# Patient Record
Sex: FEMALE | Race: BLACK OR AFRICAN AMERICAN | NOT HISPANIC OR LATINO | Employment: UNEMPLOYED | URBAN - METROPOLITAN AREA
[De-identification: names, ages, dates, MRNs, and addresses within clinical notes are randomized per-mention and may not be internally consistent; named-entity substitution may affect disease eponyms.]

---

## 2018-01-30 PROBLEM — S83.242A TEAR OF MEDIAL MENISCUS OF LEFT KNEE: Status: ACTIVE | Noted: 2018-01-30

## 2018-02-08 PROBLEM — Z74.09 IMPAIRED FUNCTIONAL MOBILITY, BALANCE, AND ENDURANCE: Status: ACTIVE | Noted: 2018-02-08

## 2018-02-08 PROBLEM — R29.3 ABNORMAL POSTURE: Status: ACTIVE | Noted: 2018-02-08

## 2018-02-08 PROBLEM — Z78.9 DIFFICULTY NAVIGATING STAIRS: Status: ACTIVE | Noted: 2018-02-08

## 2018-02-08 PROBLEM — R26.2 DIFFICULTY IN WALKING: Status: ACTIVE | Noted: 2018-02-08

## 2018-02-08 PROBLEM — M62.81 MUSCLE WEAKNESS OF LOWER EXTREMITY: Status: ACTIVE | Noted: 2018-02-08

## 2018-02-08 PROBLEM — R26.9 ABNORMALITY OF GAIT: Status: ACTIVE | Noted: 2018-02-08

## 2018-02-08 PROBLEM — M25.562 LEFT KNEE PAIN: Status: ACTIVE | Noted: 2018-02-08

## 2018-02-08 PROBLEM — M25.462 SWELLING OF KNEE JOINT, LEFT: Status: ACTIVE | Noted: 2018-02-08

## 2018-02-08 PROBLEM — M25.662 DECREASED RANGE OF MOTION OF LEFT KNEE: Status: ACTIVE | Noted: 2018-02-08

## 2019-05-30 ENCOUNTER — INITIAL CONSULT (OUTPATIENT)
Dept: BARIATRICS | Facility: CLINIC | Age: 49
End: 2019-05-30

## 2019-05-30 ENCOUNTER — CLINICAL SUPPORT (OUTPATIENT)
Dept: BARIATRICS | Facility: CLINIC | Age: 49
End: 2019-05-30

## 2019-05-30 VITALS
HEART RATE: 92 BPM | WEIGHT: 293 LBS | SYSTOLIC BLOOD PRESSURE: 150 MMHG | HEIGHT: 67 IN | BODY MASS INDEX: 45.99 KG/M2 | DIASTOLIC BLOOD PRESSURE: 90 MMHG

## 2019-05-30 VITALS — WEIGHT: 293 LBS | HEIGHT: 67 IN | BODY MASS INDEX: 45.99 KG/M2

## 2019-05-30 DIAGNOSIS — M25.561 RIGHT KNEE PAIN, UNSPECIFIED CHRONICITY: ICD-10-CM

## 2019-05-30 DIAGNOSIS — N39.3 URINARY, INCONTINENCE, STRESS FEMALE: ICD-10-CM

## 2019-05-30 DIAGNOSIS — M50.90 CERVICAL DISC DISORDER: ICD-10-CM

## 2019-05-30 DIAGNOSIS — E66.01 MORBID OBESITY WITH BMI OF 45.0-49.9, ADULT: ICD-10-CM

## 2019-05-30 PROBLEM — R26.2 DIFFICULTY IN WALKING: Status: RESOLVED | Noted: 2018-02-08 | Resolved: 2019-05-30

## 2019-05-30 PROBLEM — Z78.9 DIFFICULTY NAVIGATING STAIRS: Status: RESOLVED | Noted: 2018-02-08 | Resolved: 2019-05-30

## 2019-05-30 PROBLEM — R29.3 ABNORMAL POSTURE: Status: RESOLVED | Noted: 2018-02-08 | Resolved: 2019-05-30

## 2019-05-30 PROBLEM — Z74.09 IMPAIRED FUNCTIONAL MOBILITY, BALANCE, AND ENDURANCE: Status: RESOLVED | Noted: 2018-02-08 | Resolved: 2019-05-30

## 2019-05-30 PROBLEM — R26.9 ABNORMALITY OF GAIT: Status: RESOLVED | Noted: 2018-02-08 | Resolved: 2019-05-30

## 2019-05-30 PROBLEM — M62.81 MUSCLE WEAKNESS OF LOWER EXTREMITY: Status: RESOLVED | Noted: 2018-02-08 | Resolved: 2019-05-30

## 2019-05-30 PROCEDURE — 99205 PR OFFICE/OUTPT VISIT, NEW, LEVL V, 60-74 MIN: ICD-10-PCS | Mod: S$GLB,,, | Performed by: NURSE PRACTITIONER

## 2019-05-30 PROCEDURE — 99999 PR PBB SHADOW E&M-EST. PATIENT-LVL IV: CPT | Mod: PBBFAC,,, | Performed by: NURSE PRACTITIONER

## 2019-05-30 PROCEDURE — 99499 UNLISTED E&M SERVICE: CPT | Mod: S$GLB,,, | Performed by: DIETITIAN, REGISTERED

## 2019-05-30 PROCEDURE — 99999 PR PBB SHADOW E&M-EST. PATIENT-LVL III: CPT | Mod: PBBFAC,,, | Performed by: DIETITIAN, REGISTERED

## 2019-05-30 PROCEDURE — 99999 PR PBB SHADOW E&M-EST. PATIENT-LVL III: ICD-10-PCS | Mod: PBBFAC,,, | Performed by: DIETITIAN, REGISTERED

## 2019-05-30 PROCEDURE — 99205 OFFICE O/P NEW HI 60 MIN: CPT | Mod: S$GLB,,, | Performed by: NURSE PRACTITIONER

## 2019-05-30 PROCEDURE — 99499 NO LOS: ICD-10-PCS | Mod: S$GLB,,, | Performed by: DIETITIAN, REGISTERED

## 2019-05-30 PROCEDURE — 99999 PR PBB SHADOW E&M-EST. PATIENT-LVL IV: ICD-10-PCS | Mod: PBBFAC,,, | Performed by: NURSE PRACTITIONER

## 2019-05-30 RX ORDER — TIZANIDINE HYDROCHLORIDE 6 MG/1
6 CAPSULE, GELATIN COATED ORAL 3 TIMES DAILY PRN
COMMUNITY
Start: 2019-03-21 | End: 2020-02-09

## 2019-05-30 RX ORDER — LISDEXAMFETAMINE DIMESYLATE 60 MG/1
60 CAPSULE ORAL EVERY MORNING
Refills: 0 | COMMUNITY
Start: 2019-04-09 | End: 2021-07-20

## 2019-05-30 RX ORDER — OMEPRAZOLE 40 MG/1
40 CAPSULE, DELAYED RELEASE ORAL EVERY MORNING
COMMUNITY
End: 2021-07-20

## 2019-05-30 RX ORDER — METHYLPREDNISOLONE 4 MG/1
TABLET ORAL
Refills: 2 | COMMUNITY
Start: 2019-03-20 | End: 2019-05-30 | Stop reason: ALTCHOICE

## 2019-05-30 RX ORDER — CLOBETASOL PROPIONATE 0.46 MG/ML
SOLUTION TOPICAL
Refills: 5 | COMMUNITY
Start: 2019-04-09 | End: 2021-07-20

## 2019-05-30 RX ORDER — IBUPROFEN 800 MG/1
800 TABLET ORAL 3 TIMES DAILY PRN
COMMUNITY

## 2019-05-30 NOTE — PROGRESS NOTES
BARIATRIC NEW PATIENT EVALUATION    CHIEF COMPLAINT:   Morbid Obesity Body mass index is 46.83 kg/m². and difficulty with weight loss.     HISTORY OF PRESENT ILLNESS:  Jeremiah Fenton  is a 48 y.o. female presenting for morbid obesity Body mass index is 46.83 kg/m². and difficulty with weight loss. The patient has tried low CHO diet, modified Keto, gym. She was able to lose 40 pounds with diet and exercise, which she subsequently regained. Physical activity is limited 2/2 knee pain. Wt difficulty began with childbirth.  Today is her highest wt.     H/o achalasia, treated surgically Heller myotomy with Anuj fundoplication   H/o H pylori treatment.     Stress incontinence.  Denies h/o recurrent UTI.     Denies any psychiatric history.     PAST MEDICAL HISTORY:  Past Medical History:   Diagnosis Date    Achalasia     Anemia     Cervical disc disorder     GERD (gastroesophageal reflux disease)     Medial meniscus tear     LEFT    Sinus arrhythmia     Urinary, incontinence, stress female     Wears glasses     Wears glasses          PAST SURGICAL HISTORY:  Past Surgical History:   Procedure Laterality Date    APPENDECTOMY      ARTHROSCOPY-KNEE Left 2018    [facility]     SECTION, CLASSIC      X 3    ESOPHAGOMYOTOMY, LAPAROSCOPIC, HELLER N/A 2013    [facility] OR 2ND FLR    FUNDOPLICATION N/A 2013    [facility] at [facility] OR 2ND FLR    Heller myotomy with Anuj fundoplication      HYSTERECTOMY      meniscal tear repair, left knee      MENISCECTOMY-MEDIAL PARTIAL Left 2018    [facility] OR    MOTILITY STUDY, ESOPHAGUS, USING HIGH RESOLUTION MANOMETRY N/A 2013    Performed by Eric Clement [facility] at [facility] ENDO (4TH FLR)    REPAIR-HERNIA-HIATAL-LAPAROSCOPIC N/A 2013    Per[facility] by [facility] at [facility] OR 2ND FLR        FAMILY HISTORY:  Family History   Problem Relation Age of Onset    Hypertension Mother     Hypertension Father     Stroke Father     Hypertension Sister        SOCIAL HISTORY:  Social History     Socioeconomic History    Marital status:      Spouse name: Not on file    Number of children: Not on file    Years of education: Not on file    Highest education level: Not on file   Occupational History    Not on file   Social Needs    Financial resource strain: Not on file    Food insecurity:     Worry: Not on file     Inability: Not on file    Transportation needs:     Medical: Not on file     Non-medical: Not on file   Tobacco Use    Smoking status: Never Smoker    Smokeless tobacco: Never Used   Substance and Sexual Activity    Alcohol use: No    Drug use: No    Sexual activity: Not on file   Lifestyle    Physical activity:     Days per week: Not on file     Minutes per session: Not on file    Stress: Not on file   Relationships    Social connections:     Talks on phone: Not on file     Gets together: Not on file     Attends Yarsanism service: Not on file     Active member of club or organization: Not on file     Attends meetings of clubs or organizations: Not on file     Relationship status: Not on file   Other Topics Concern    Not on file   Social History Narrative    Pharmacist.        MEDICATIONS:  Current Outpatient Medications   Medication Sig Dispense Refill    clobetasol (TEMOVATE) 0.05 % external solution DEMETRIA TOPICALLY TO SCALP BID as needed  5    ibuprofen (ADVIL,MOTRIN) 800 MG tablet Take 800 mg by mouth 3 (three) times daily as needed for Pain.      omeprazole (PRILOSEC) 40 MG capsule Take 40 mg by mouth every morning. Only takes with ibuprofen      VYVANSE 60 mg capsule Take 60 mg by mouth every morning.  0    tiZANidine (ZANAFLEX) 6 mg capsule 6 mg 3 (three) times daily as needed.        No current facility-administered medications for this visit.   "      ALLERGIES:  Review of patient's allergies indicates:   Allergen Reactions    Dilaudid [hydromorphone (bulk)] Itching       ROS:  Review of Systems   Constitutional: Negative for chills, fever and malaise/fatigue.   Respiratory: Negative for cough, shortness of breath (denies ALAS) and wheezing.    Cardiovascular: Positive for leg swelling. Negative for chest pain and palpitations.   Gastrointestinal: Negative for abdominal pain, blood in stool, constipation, diarrhea, heartburn, melena, nausea and vomiting.   Genitourinary: Positive for urgency (urge incontinence, baseline). Negative for dysuria and frequency.   Musculoskeletal: Positive for joint pain and neck pain. Negative for back pain and myalgias.   Neurological: Positive for headaches (every now and then). Negative for dizziness and tingling.   Psychiatric/Behavioral: Negative for depression, substance abuse and suicidal ideas. The patient is not nervous/anxious.        PE:  Vitals:    05/30/19 1530   BP: (!) 150/90   Pulse: 92   Weight: 135.6 kg (299 lb)   Height: 5' 7" (1.702 m)       Physical Exam   Constitutional: She is oriented to person, place, and time. She appears well-developed and well-nourished. No distress.   Morbidly obese   HENT:   Head: Normocephalic and atraumatic.   Eyes: Conjunctivae are normal. Right eye exhibits no discharge. Left eye exhibits no discharge. No scleral icterus.   Neck: Neck supple.   Cardiovascular: Normal rate, regular rhythm and normal heart sounds. Exam reveals no gallop and no friction rub.   No murmur heard.  Pulmonary/Chest: Effort normal and breath sounds normal. No respiratory distress. She has no wheezes. She has no rales.   Abdominal: Soft. Bowel sounds are normal. She exhibits no distension and no mass. There is no tenderness. There is no rebound and no guarding. No hernia.   Musculoskeletal: Normal range of motion. She exhibits no edema.   Neurological: She is alert and oriented to person, place, and " time.   Skin: Skin is warm, dry and intact. No rash noted. She is not diaphoretic. No erythema. No pallor.   Psychiatric: She has a normal mood and affect. Her speech is normal and behavior is normal. Judgment and thought content normal.   Nursing note and vitals reviewed.       DIAGNOSIS:  1. Morbid Obesity with Body mass index is 46.83 kg/m². and difficulty with weight loss.  2. Co-morbidities: none  3. H/o achalasia, h/o Heller myotomy with Anuj fundoplication     PLAN:  The patient is a potential candidate for Bariatric Surgery. The surgery and post-op care were discussed in detail with the patient. All questions were answered. She will work with Dr. Appiah. Will discuss her case re: Heller myotomy with Anuj fundoplication and surgical options.     she understands that bariatric surgery is a tool to aid in weight loss and that she needs to be committed to the diet and exercise post-operatively for successful weight loss.  Discussed expected weight loss outcomes after surgery which is 50% of the excess weight on her frame. Discussed with patient that bariatric surgery is not the easy way out and that it will take plenty of dedication on the patient's part to be successful. Also discussed the possibility of weight regain if the patient strays from the diet guidelines or exercise requirements. Patient verbalized understanding and wishes to proceed with the work-up.    ORDERS:  1. Chest X-Ray and EKG  2. Psychological Consult and Bariatric Dietician Consult  3. Bariatric Labs: BMP, CBC, Folate Serum, H. pylori, HgA1C, Hepatic Panel/LFT, Iron & TIBC, Lipid Profile, Magnesium, Phosphate, T3, T4, TSH, Free T4, Vitamin B12, and Vitamin B1.  4. Short form psych testing    Primary Physician: Norbert Robison MD  RTC: As scheduled.    60 minute visit, over 50% of time spent counseling patient face to face on surgical options, risks, benefits, expected diet, recommended exercise regimen, and expected weight loss.

## 2019-05-30 NOTE — Clinical Note
Dr. Appiah,This is a patient that Olivia saw on Thursday. This patient is interested in having a sleeve, but she has a hx of a Heller myotomy with josue fundoplication due to achalasia completed by you some years ago. Before she moves forward she wants to know if she can 1. Have the surgery 2. If any procedure is better with her medical hx 3. If she will suffer with reflux post procedure. I have included Olivia's chart in this message as she was the one who actually consulted the patient and I am covering for her until she returns from vacation.

## 2019-05-30 NOTE — PATIENT INSTRUCTIONS
Continue to review Bariatric Nutrition Guidebook at home and call with any questions.  Work on expanding variety of vegetables.  Work on gradually cutting back on starchy CHO in the diet.  Begin trying various protein supplements to determine preference.  5-6 meals per day.  More grocery shopping and meal preparation at home.  Return to clinic.  keep food log.    Meal Ideas for Regular Bariatric Diet  *Recipes and products available at www.bariatriceating.com      Breakfast: (15-20g protein)    - Egg white omelet: 2 egg whites or ½ cup Egg Beaters. (Optional proteins: cheese, shrimp, black beans, chicken, sliced turkey) (Optional veggies: tomatoes, salsa, spinach, mushrooms, onions, green peppers, or small slice avocado)     - Egg and sausage: 1 egg or ¼ cup Egg Beaters (any variety), with 1 rosa or 2 links of Turkey sausage or Veggie breakfast sausage (Docracy or [employer])    - Crust-less breakfast quiche: To make a glass pie dish, mix 4oz part skim Ricotta, 1 cup skim milk, and 2 eggs as your base. Add protein: shredded cheese, sliced lean ham or turkey, turkey lester/sausage. Add veggies: tomato, onion, green onion, mushroom, green pepper, spinach, etc.    - Yogurt parfait: Mix 1 - 6oz container Dannon Light N Fit vanilla yogurt, with ¼ cup crushed unsalted nuts    - Cottage cheese and fruit: ½ cup part-skim cottage cheese or ricotta cheese topped with fresh fruit or sugar free preserves     - Selena Marroquin's Vanilla Egg custard* (add 2 Tbsp instant coffee granules to make Cappuccino Custard*)    - Hi-Protein café latte (skim milk, decaf coffee, 1 scoop protein powder). Optional to add Sugar free syrup or extract flavoring.    - Breakfast Lox: spread fat free cream cheese on slices of smoked salmon. Serve over scrambled or egg over easy (sauteed with nonstick cookspray) OR on a cucumber slice    - Eggwhich: Scramble or cook 1 large egg over easy using nonstick cookspray. Place between 2 slices of Malagasy  lester and low fat cheese.     Lunch: (20-30g protein)    - ½ cup Black bean soup (Homemade or Progresso), with ¼ cup shredded low-fat cheese. Top with chopped tomato or fresh salsa.     - Lean deli turkey breast and low-fat sliced cheese, mustard or light meek to moisten, rolled up together, or wrapped in a Kleber lettuce leaf    - Chicken salad made from dinner leftovers, moisten with low-fat salad dressing or light meek. Also try leftover salmon, shrimp, tuna or boiled eggs. Serve ½ cup over dark green salad    - Fat-free canned refried beans, topped with ¼ cup shredded low-fat cheese. Top with chopped tomato or fresh salsa.     - Greek salad: Top mixed greens with 1-2oz grilled chicken, tomatoes, red onions, 2-3 kalamata olives, and sprinkle lightly with feta cheese. Spritz with Balsamic vinegar to taste.     - Crust-less lunch quiche: To make a glass pie dish, mix 4oz part skim Ricotta, 1 cup skim milk, and 2 eggs as your base. Add protein: shredded cheese, sliced lean ham or turkey, shrimp, chicken. Add veggies: tomato, onion, green onion, mushroom, green pepper, spinach, artichoke, broccoli, etc.    - Pizza bake: spread a  aranza angelina mushroom with tomato sauce, low-fat shredded mozzarella and turkey pepperoni or Moroccan lester. Add any veggies. Roast for 10-15 minutes, until cheese melted.     - Cucumber crab bites: Spread ¼ cup crab dip (lump crabmeat + light cream cheese and green onions) over sliced cucumber.     - Chicken with light spinach and artichoke dip*: Puree in : 6oz cooked and drained spinach, 2 cloves garlic, 1 can cannelloni beans, ½ cup chopped green onions, 1 can drained artichoke hearts (not marinated in oil), lemon juice and basil. Mix in 2oz chopped up chicken.    Supper: (20-30g protein)    - Serve grilled fish over dark green salad tossed with low-fat dressing, served with grilled asparagus wade     - Rotisserie chicken salad: served with sliced strawberries, walnuts,  fat-free feta cheese crumbles and 1 tbsp Freitass Own Light Raspberry Cushing Vinaigrette    - Shrimp cocktail: Dip cold boiled shrimp in homemade low-sugar cocktail sauce (1/2 cup Cathie One Carb ketchup, 2 tbsp horseradish, 1/4 tsp hot sauce, 1 tsp Worcestershire sauce, 1 tbsp freshly-squeezed lemon juice). Serve with dark green salad, walnuts, and crumbled blue cheese drizzled with olive oil and Balsamic vinegar    - Tuna Melt: Spread tuna salad onto 2 thick slices of tomato. Top with low-fat cheese and broil until cheese is melted. May also be made with chicken salad of shrimp salad. [place] with different types of cheeses.    - Chicken or beef fajitas (no tortilla, rice, beans, chips). Top meat and veggies w/ fresh salsa, fat free sour cream.     - Homemade low-fat Chili using extra lean ground beef or ground turkey. Top with shredded cheese and salsa as desired. May add dollop fat-free sour cream if desired    - Chicken parmesan: Top chicken breast w/ low sugar marinara sauce, mozzarella cheese and bake until chicken reaches 165*.  Serve w/ spaghetti SQUASH or Georgian cut green beans    - Dinner Omelet with shrimp or chicken and onion, green peppers and chives.    - No noodle lasagna: Use sliced zucchini or eggplant in place of noodles.  Layer with part skim ricotta cheese and low sugar meat sauce (use very lean ground beef or ground turkey).    - Mexican chicken bake: Bake chunks of chicken breast or thigh with taco seasoning, Pace brand enchilada sauce, green onions and low-fat cheese. Serve with ¼ cup black beans or fat free refried beans topped with chopped tomatoes or salsa.    - Karl frozen meatballs, simmered in Classico Marinara sauce. Different flavors of salsa or spaghetti sauce create different dishes! Sprinkle with parmesan cheese. Serve with grilled or steamed veggies, or a dark green salad.    - Simmer boneless skinless chicken thigh chunks in Classico Marinara sauce or roasted salsa until  tender with chopped onion, bell pepper, garlic, mushrooms, spinach, etc.     - Hamburger or veggie burger, without the bun, dressed the way you like. Served with grilled or steamed veggies.    - Eggplant parmesan: Bake slices of eggplant at 350 degrees for 15 minutes. Layer tomato sauce, sliced eggplant and low-fat mozzarella cheese in a baking dish and cover with foil. Bake 30-40 more minutes or until bubbly. Uncover and bake at 400 degrees for about 15 more minutes, or until top is slightly crisp.    - Fish tacos: grilled/baked white fish, wrapped in Kleber lettuce leaf, topped with salsa, shredded low-fat cheese, and light coleslaw.    - Chicken artis: Sprinkle chicken w/ 1 tsp of hidden valley ranch dip mix. Then grill chicken and top with black beans, salsa and 1 tsp fat free sour cream.     - Cauliflower pizza crust: Use cauliflower as crust (see recipe on [facility], no flour!). Top w/ low fat cheese, turkey pepperoni and veggies and bake again    - chicken or turkey crust pizza: use ground chicken or turkey instead of cauliflower, spread in [place] and bake at 350 for about 20-30 minutes(may want to add garlic, black pepper, oregano and other herbs to ground meat mixture).  Remove and top w/ low fat cheese, turkey pepperoni and veggies and bake again for another 10 minutes or until cheese is browned.     Snacks: (100-200 calories; >5g protein)    - 1 low-fat cheese stick with 8 cherry tomatoes or 1 serving fresh fruit  - 4 thin slices fat-free turkey breast and 1 slice low-fat cheese  - 4 thin slices fat-free honey ham with wedge of melon  - 6-8 edamame pods (equivalent to about 1/4 cup edamame without pods).   - 1/4 cup unsalted nuts with ½ cup fruit  - 6-oz container Dannon Light n Fit vanilla yogurt, topped with 1oz unsalted nuts         - apple, celery or baby carrots spread with 2 Tbsp PB2  - apple slices with 1 oz slice low-fat cheese  - Apple slices dipped in 2 Tbsp of PB2  - celery, cucumber, bell  pepper or baby carrots dipped in ¼ cup hummus bean spread or light spinach and artichoke dip (*recipe in lunch section)  - celery, cucumber, baby carrots dipped in high protein greek yogurt (Mix 16 oz plain greek yogurt + 1 packet of hidden valley ranch dip mix)  - Jesus Links Beef Steak - 14g protein! (similar to beef jerky)  - 2 wedges Laughing Cow - Light Herb & Garlic Cheese with sliced cucumber or green bell pepper  - 1/2 cup low-fat cottage cheese with ¼ cup fruit or ¼ cup salsa  - RTD Protein drinks: Atkins, Low Carb Slim Fast, EAS light, Muscle Milk Light, etc.  - Homemade Protein drinks: GNC Soy95, Isopure, Nectar, UNJURY, Whey Gourmet, etc. Mix 1 scoop powder with 8oz skim/1% milk or light soymilk.  - Protein bars: Atkins, EAS, Pure Protein, Think Thin, Detour, etc. Must have 0-4 grams sugar - Read the label.    Takeout Options: No more than twice/week  Deli - Salads (no pasta or rice), meats, cheeses. Roasted chicken. Lox (salmon)    Mexican - Platters which don't include tortillas, chips, or rice. Go easy on the beans. Example: Fajitas without the tortillas. Ask the  not to bring chips to the table if they are too tempting.    Greek - Meat or fish and vegetable, but no bread or rice. Including hummus, baba ganoush, etc, is OK. Most sit-down Greek restaurants can provide you with cucumber slices for dipping instead of rosalinda bread.    Fast Food (Avoid as much as possible) - Salads (no croutons and limit salad dressing to 2 tbsp), grilled chicken sandwich without the bun and ask for no meek. Mikalas low fat chili or Taco Bell pintos and cheese.    BBQ - The meats are fine if you ask for sauces on the side, but most of the traditional side dishes are loaded with carbs. Jack slaw, baked beans and BBQ sauce are typically made with sugar.    Chinese - Nothing deep-fried, no rice or noodles. Many Chinese sauces have starch and sugar in them, so you'll have to use your judgement. If you find that these  sauces trigger cravings, or cause Dumping, you can ask for the sauce to be made without sugar or just use soy sauce.      Lactose-free & Artificial Sweetener-free Protein Powders    Remember, your protein shake should have less than 4 grams of sugar per serving. For whey powders, choose 100% whey pro isolate, not a blend. Blends add creatine in as a filler.    Natural Zero-calorie sweeteners  ? Stevia  ? Truvia  ? Swerve (http://www.College[employer]Connect/about-swerve/)     Lactose-free Protein Powders  ? [facility] 100% Egg Protein (Vanilla & Chocolate)  ? [facility] Pro Performance 100% Soy Isolate (Vanilla & Chocolate)  ? Bluebonnet Protein Powder    Lactose-free Protein Shakes  ? Muscle Milk    Artificial Sweetener-free Protein Powders  ? Pure Protein Natural Whey Protein Powder (Vanilla, Chocolate & Strawberry)  ? Taras Peterson Whey Pro Isolate (sweetened w/ stevia) (Vanilla, Chocolate, Strawberry, Pina Coloda & Tropical Dreamsicle)  ? Nature's Best Natural Isopure - Unflavored (zero carb, found at [facility])    Lactose & Artificial-Sweetener-Free Protein Powders  ? Taras Peterson Egg White Protein (sweetened w/ stevia)  ? TwinLab Clean Series Soy Protein (unflavored, found at [facility])  ? About Time Whey Protein Isolate powder (from [employer], sweetened with stevia, gluten-free) (http://Tutellus.RailComm/shop/2lb-whey-protein-isolate/)

## 2019-05-30 NOTE — PATIENT INSTRUCTIONS
Dr. Vineet Shelton      Prior to surgery you will need to complete:  - Dietitian consult and follow up appointments as needed  - Labs  - Chest X-ray  - EKG  - Psychological evaluation, Please call psychiatry [phone] to schedule    In preparation for bariatric surgery, please complete the following:   · Discuss your current medications with your primary care provider, remember your medications will need to be crushed, chewable, or in liquid form for the first 3-6 months after a gastric bypass or sleeve.  For a gastric band, your medications will need to be crushed indefinitely.    · If you take a blood thinner such as: Coumadin (warfarin), Pradaxa (dabigatran), or Plavix (clopidogrel), you will need to speak with your prescribing provider on how or if this medication can be stopped before surgery.   · If you take a medication for depression or anxiety, you will need to begin crushing or opening the capsule 1-3 months prior to surgery.  Remember to discuss this with the psychologist or psychiatrist that you see.   · If you take medication for arthritis on a daily basis that is considered a non-steroidal anti-inflammatory (NSAID), please discuss with your prescribing physician an alternative medication.  After having gastric bypass or gastric sleeve, this group of medications is not appropriate to take due to increased risk of bleeding stomach ulcers.      DEFINITIONS  Appointments: Pre-scheduled meetings or consultations with any physician, advanced practice provider, dietitian, or psychologist, and labs, imaging studies, sleep studies, etc.   Late cancellation: Cancelling an appointment 24-48 hours prior to scheduled time.  No-Show appointment:  is when    You do NOT arrive to your appointment at the time its scheduled.   You call to cancel or cancel via [facility] less than 24 hours in advance of your scheduled appointment   You show up 15 minutes AFTER  your scheduled appointment time without any notification of being late.     POLICY  1. You are allowed up to 3 cancellations for appointments.    After 3 cancellations your case will be placed on hold for 2 months and after that time you can resume the program.   2. You are allowed only 1 no-show for an appointment.    You will be re-scheduled one time and if there is a 2nd no-show at any point, your case will be placed on hold for 3 months.  After 3 months you can resume the program.     3. Upon resuming the program after being placed on hold for either above mentioned reasons, if you have a late cancel or no show for any appointment, the bariatric team will review if youre an appropriate candidate for surgery at the monthly meeting.

## 2019-05-30 NOTE — LETTER
[facility] - Bariatric Surgery  [address]  [facility] [address]  Phone: [phone]  Fax: [phone] May 30, 2019      Norbert Robison MD  [address] [facility] [address]    Patient: Jeremiah Fenton   MR Number: [id number]   YOB: 1970   Date of Visit: 5/30/2019     Dear Dr. Robison:    Thank you for referring Jeremiah Fenton to me for evaluation. Below are the relevant portions of my assessment and plan of care.    DIAGNOSIS:  1. Morbid Obesity with Body mass index is 46.83 kg/m². and difficulty with weight loss.  2. Co-morbidities: none  3. H/o achalasia, h/o Heller myotomy with Anuj fundoplication      PLAN:  The patient is a potential candidate for bariatric surgery. The surgery and post-op care were discussed in detail with the patient. All questions were answered. She will work with Dr. Appiah. Will discuss her case re: Heller myotomy with Anuj fundoplication and surgical options.      She understands that bariatric surgery is a tool to aid in weight loss and that she needs to be committed to the diet and exercise post-operatively for successful weight loss.  We discussed expected weight loss outcomes after surgery which is 50% of the excess weight on her frame.  It was discussed with the patient that bariatric surgery is not the easy way out and that it will take plenty of dedication on the patient's part to be successful. We also discussed the possibility of weight regain if the patient strays from the diet guidelines or exercise requirements.  The patient verbalized understanding and wishes to proceed with the work-up.     ORDERS:  1. Chest X-Ray and EKG  2. Psychological Consult and Bariatric Dietician Consult  3. Bariatric Labs: BMP, CBC, Folate Serum, H. pylori, HgA1C, Hepatic Panel/LFT, Iron & TIBC, Lipid Profile, Magnesium, Phosphate, T3, T4, TSH, Free T4, Vitamin B12, and Vitamin B1.  4. Short form psych testing     60 minute visit, over 50% of time  spent counseling patient face to face on surgical options, risks, benefits, expected diet, recommended exercise regimen, and expected weight loss.    If you have questions, please do not hesitate to call me. I look forward to following Jeremiah along with you.    Sincerely,        RAZIA Clemons   Section of Bariatric Surgery  Department of Surgery  [facility]    NP/afw

## 2019-06-14 ENCOUNTER — PATIENT MESSAGE (OUTPATIENT)
Dept: BARIATRICS | Facility: CLINIC | Age: 49
End: 2019-06-14

## 2019-06-19 ENCOUNTER — PATIENT MESSAGE (OUTPATIENT)
Dept: BARIATRICS | Facility: CLINIC | Age: 49
End: 2019-06-19

## 2019-06-19 NOTE — TELEPHONE ENCOUNTER
Telephone call.   Conferred with Dr. Appiah re: sleeve after Heller. Conclusion: difficulty to know if a sleeve will give her GERD. If her main concern is GERD, Dr. Appiah suggests bypass. She denies any issues with GERD recently. Discussed bypass procedure. She will research additionally and notify us with final determination. She would like to rewatch seminar.

## 2020-02-04 ENCOUNTER — DOCUMENTATION ONLY (OUTPATIENT)
Dept: BARIATRICS | Facility: CLINIC | Age: 50
End: 2020-02-04

## 2020-02-09 PROBLEM — R50.9 FEVER: Status: ACTIVE | Noted: 2020-02-09

## 2020-02-09 PROBLEM — R07.9 CHEST PAIN: Status: ACTIVE | Noted: 2020-02-09

## 2020-02-09 PROBLEM — R10.9 LEFT FLANK PAIN: Status: ACTIVE | Noted: 2020-02-09

## 2021-05-04 ENCOUNTER — PATIENT MESSAGE (OUTPATIENT)
Dept: RESEARCH | Facility: HOSPITAL | Age: 51
End: 2021-05-04

## 2021-07-21 PROBLEM — Z12.11 SCREEN FOR COLON CANCER: Status: ACTIVE | Noted: 2021-07-21

## 2023-02-24 ENCOUNTER — TELEPHONE (OUTPATIENT)
Dept: BARIATRICS | Facility: CLINIC | Age: 53
End: 2023-02-24
Payer: COMMERCIAL

## 2023-02-24 NOTE — TELEPHONE ENCOUNTER
Phoned patient back and discussed her concerns and wanting to have bariatric surgery with Dr Appiah.  She had surgery back in 2013 with Dr Appiah for achalasia and had an esophagomyotomy and heller.  He suggested bariatric surgery [facility] after she had healed.  She knows she does not have bariatric coverage.  Explained the process of the FC phone call and she can get self pay prices then.  Scheduled FC appt an appt with Dr Appiah for a consult.  Advised that she can do the RD appts virtually and the psych appts as well.  Advised that Dr Appiah may want extensive testing that might only be done at [facility]. Told her that some of the testing might be able to be done closer to her home in [place].  She was very appreciative and verbalized understanding.

## 2023-02-24 NOTE — TELEPHONE ENCOUNTER
----- Message from Zeenat Segovia sent at 2/24/2023  4:11 PM CST -----  Regarding: appt needed  Contact: Pt @ [phone]  Pt is trying to be scheduled for appt with provider; pt states Dr. Appiah told her to come back for for follow up in a few months when she was completely healed. Please call to advise, thank you.

## 2023-03-14 ENCOUNTER — OFFICE VISIT (OUTPATIENT)
Dept: BARIATRICS | Facility: CLINIC | Age: 53
End: 2023-03-14
Payer: COMMERCIAL

## 2023-03-14 ENCOUNTER — PATIENT MESSAGE (OUTPATIENT)
Dept: BARIATRICS | Facility: CLINIC | Age: 53
End: 2023-03-14

## 2023-03-14 VITALS
DIASTOLIC BLOOD PRESSURE: 92 MMHG | SYSTOLIC BLOOD PRESSURE: 195 MMHG | BODY MASS INDEX: 44.88 KG/M2 | HEART RATE: 85 BPM | OXYGEN SATURATION: 95 % | WEIGHT: 293 LBS

## 2023-03-14 DIAGNOSIS — E66.01 MORBID OBESITY WITH BMI OF 45.0-49.9, ADULT: Primary | ICD-10-CM

## 2023-03-14 PROCEDURE — 99204 PR OFFICE/OUTPT VISIT, NEW, LEVL IV, 45-59 MIN: ICD-10-PCS | Mod: S$GLB,,, | Performed by: SURGERY

## 2023-03-14 PROCEDURE — 1159F PR MEDICATION LIST DOCUMENTED IN MEDICAL RECORD: ICD-10-PCS | Mod: CPTII,S$GLB,, | Performed by: SURGERY

## 2023-03-14 PROCEDURE — 3008F PR BODY MASS INDEX (BMI) DOCUMENTED: ICD-10-PCS | Mod: CPTII,S$GLB,, | Performed by: SURGERY

## 2023-03-14 PROCEDURE — 99204 OFFICE O/P NEW MOD 45 MIN: CPT | Mod: S$GLB,,, | Performed by: SURGERY

## 2023-03-14 PROCEDURE — 99999 PR PBB SHADOW E&M-EST. PATIENT-LVL III: ICD-10-PCS | Mod: PBBFAC,,, | Performed by: SURGERY

## 2023-03-14 PROCEDURE — 3080F PR MOST RECENT DIASTOLIC BLOOD PRESSURE >= 90 MM HG: ICD-10-PCS | Mod: CPTII,S$GLB,, | Performed by: SURGERY

## 2023-03-14 PROCEDURE — 1159F MED LIST DOCD IN RCRD: CPT | Mod: CPTII,S$GLB,, | Performed by: SURGERY

## 2023-03-14 PROCEDURE — 3080F DIAST BP >= 90 MM HG: CPT | Mod: CPTII,S$GLB,, | Performed by: SURGERY

## 2023-03-14 PROCEDURE — 1160F RVW MEDS BY RX/DR IN RCRD: CPT | Mod: CPTII,S$GLB,, | Performed by: SURGERY

## 2023-03-14 PROCEDURE — 99999 PR PBB SHADOW E&M-EST. PATIENT-LVL III: CPT | Mod: PBBFAC,,, | Performed by: SURGERY

## 2023-03-14 PROCEDURE — 1160F PR REVIEW ALL MEDS BY PRESCRIBER/CLIN PHARMACIST DOCUMENTED: ICD-10-PCS | Mod: CPTII,S$GLB,, | Performed by: SURGERY

## 2023-03-14 PROCEDURE — 3008F BODY MASS INDEX DOCD: CPT | Mod: CPTII,S$GLB,, | Performed by: SURGERY

## 2023-03-14 PROCEDURE — 3077F PR MOST RECENT SYSTOLIC BLOOD PRESSURE >= 140 MM HG: ICD-10-PCS | Mod: CPTII,S$GLB,, | Performed by: SURGERY

## 2023-03-14 PROCEDURE — 3077F SYST BP >= 140 MM HG: CPT | Mod: CPTII,S$GLB,, | Performed by: SURGERY

## 2023-03-14 NOTE — LETTER
March 14, 2023        Norbert Robison MD  [address] [facility] [place] [address]             [facility] - Bariatric Surg 2nd Fl  [address]  [facility] [address]  Phone: [phone]  Fax: [phone]   Patient: Jeremiah Fenton   MR Number: [id number]   YOB: 1970   Date of Visit: 3/14/2023       Dear Dr. Robison:    Thank you for referring Jeremiah Fenton to me for evaluation. Attached you will find relevant portions of my assessment and plan of care.    If you have questions, please do not hesitate to call me. I look forward to following Jeremiah Fenton along with you.    Sincerely,      Vineet Appiah MD            CC  No Recipients    Enclosure

## 2023-03-14 NOTE — PROGRESS NOTES
I have seen the patient, reviewed the Student's history and physical, assessment and plan. I have personally interviewed and examined the patient at bedside and: agree with the findings.     51y/o with left knee arthritis and bmi 44/312lb c/w morbid obesity.  S/p conchis/Anuj by me 2013.  She is doing well from achalasia standpoint.  She started gaining weight with her pregnancies.  She has tried diets without significant weight loss.  She has tried medications including vyvance without weight loss.  She is not currently exercising.  She has occasional heartburn and regurgitation (once a month) but takes no routine medications.  Sleeping with snoring.    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    PE abdomen soft, no hernias    Cbc, cmp reviewed, basically ok  Ct 2020 reviewed, films viewed    1. No colonic diverticula or evidence of diverticulitis.    2. Moderate amount of stool in the ascending and transverse colon, may reflect constipation.  No bowel dilatation.    3. 2 cm left ovarian follicular cyst.    4. Rectus diastasis  Cxr 2020 reviewed, improving pneumonia  Left knee xray reviewed, c/w arthritis  Ekg 2020 reviewed    Sinus tachycardia     Biatrial enlargement     Morbid obesity with likely rahul.    Obtain sleep study, egd and bariatric work up.  She is a good candidate for sleeve or bypass and she is considering it.

## 2023-03-14 NOTE — FIRST PROVIDER EVALUATION
.GENERAL SURGERY CLINIC NOTE    Jeremiah Fenton is a 52 y.o. female with left knee arthritis and morbid obesity (current BMI 44.8) with previous diagnosis of achalasia who underwent surgery on 2014 (Heller myotomy with josue fundoplication) who presents to clinic for bariatric surgery evaluation.  She is currently in her highest adult weight (312 lb at 51 yo). Lowest weight in adult life was 180 lbs at 21yo. She states that during childhood she had a normal BMI, however she gained weight during pregnancies (3) and could not loose them after (gain approximately 30-40 pounds during each pregnancy)  She states that her primary goal is to be able to loose weight.  She has tried multiple diets (keto), however has never followed a nutriotionist diet (only 25 years ago during pregnancy). She has tried inconsistently exercising: (walking at mornings). Currently not doing exercise.  Denies eating disorders (bulimia, anorexia) or use of laxatives for trying to loose weight. Believes that what she has tried before is not sustainable.  Denies smoking,drinking, denies other drugs  Lives with . Reports good support system.  Currently works as a pharmacist.  Takes vyvanse- once everyday, since 4 years ago without weight loss.      .GERD Questionnaire   - PPI  Ocasional Typical heartburn  ocasionally Regurgitation  - Dysphagia solids  - Dysphagia liquids  - Hoarseness  - Sore throat  - Cough  - Asthma  - Chest pain  - Water brash  - Globus  - Nausea  - Vomiting     ROS: Denies fever,shortness of breathness, symptoms of cold, diarrhea. States occasional headaches.    Past Medical History:   Diagnosis Date    Achalasia     Achalasia     Anemia     Cervical disc disorder     Esophageal stricture     lower, requiring dilatation    Family history of colonic polyps     mother    GERD (gastroesophageal reflux disease)     History of COVID-19     History of Helicobacter pylori infection     Medial meniscus tear     LEFT    Sinus  arrhythmia     Urinary, incontinence, stress female     Wears glasses     Wears glasses        Past Surgical History:   Procedure Laterality Date    APPENDECTOMY       SECTION, CLASSIC      X 3    COLONOSCOPY N/A 2021    Procedure: COLONOSCOPY;  Surgeon: Manjeet Macias MD;  Location: [facility];  Service: Endoscopy;  Laterality: N/A;  HAD COVID-VACCINATED    Heller myotomy with Anuj fundoplication      HYSTERECTOMY      meniscal tear repair, left knee         Social History     Socioeconomic History    Marital status:    Tobacco Use    Smoking status: Never    Smokeless tobacco: Never   Substance and Sexual Activity    Alcohol use: No    Drug use: No   Social History Narrative    Pharmacist.        Review of patient's allergies indicates:   Allergen Reactions    Dilaudid [hydromorphone (bulk)] Itching         PHYSICAL EXAM:  Vitals:    23 1605   BP: (!) 195/92   Pulse: 85         PERTINENT IMAGING:  Ct  reviewed, films viewed    1. No colonic diverticula or evidence of diverticulitis.    2. Moderate amount of stool in the ascending and transverse colon, may reflect constipation.  No bowel dilatation.    3. 2 cm left ovarian follicular cyst.    4. Rectus diastasis  Cxr  reviewed, improving pneumonia  Left knee xray reviewed, c/w arthritis  Ekg  reviewed    Sinus tachycardia     Biatrial enlargement       ASSESSMENT/PLAN:  Per dr Appiah's note: Obtain sleep study, egd and bariatric work up.  She is a good candidate for sleeve or bypass and she is considering it.

## 2023-03-24 ENCOUNTER — CLINICAL SUPPORT (OUTPATIENT)
Dept: BARIATRICS | Facility: CLINIC | Age: 53
End: 2023-03-24
Payer: COMMERCIAL

## 2023-03-24 ENCOUNTER — PATIENT MESSAGE (OUTPATIENT)
Dept: BARIATRICS | Facility: CLINIC | Age: 53
End: 2023-03-24

## 2023-03-24 DIAGNOSIS — Z71.3 DIETARY COUNSELING AND SURVEILLANCE: Primary | ICD-10-CM

## 2023-03-24 DIAGNOSIS — E66.01 MORBID OBESITY WITH BODY MASS INDEX (BMI) OF 40.0 OR HIGHER: ICD-10-CM

## 2023-03-24 PROCEDURE — 99499 UNLISTED E&M SERVICE: CPT | Mod: 95,,, | Performed by: DIETITIAN, REGISTERED

## 2023-03-24 PROCEDURE — 97802 MEDICAL NUTRITION INDIV IN: CPT | Mod: 95,,, | Performed by: DIETITIAN, REGISTERED

## 2023-03-24 PROCEDURE — 97802 PR MED NUTR THER, 1ST, INDIV, EA 15 MIN: ICD-10-PCS | Mod: 95,,, | Performed by: DIETITIAN, REGISTERED

## 2023-03-24 PROCEDURE — 99499 NO LOS: ICD-10-PCS | Mod: 95,,, | Performed by: DIETITIAN, REGISTERED

## 2023-03-24 NOTE — PROGRESS NOTES
"The patient location is:  Patient Home   The chief complaint leading to consultation is: morbid obesity in work up for bariatric surgery  Visit type: Virtual visit with synchronous audio and video  Total time spent with patient: 45 minutes  Each patient to whom he or she provides medical services by telemedicine is:  (1) informed of the relationship between the physician and patient and the respective role of any other health care provider with respect to management of the patient; and (2) notified that he or she may decline to receive medical services by telemedicine and may withdraw from such care at any time.  Nutrition Handout located in AVS section of pt's [facility] and/or sent as a message via [facility] portal.  Pt informed of handout and how to access this information in [employer] shelbie.  NUTRITIONAL CONSULT    Referring Physician: Vineet Appiah M.D.   Reason for MNT Referral: Initial assessment for laparoscopic Karena-en-Y work-up    PAST MEDICAL HISTORY:   52 y.o. female  There is no height or weight on file to calculate BMI..  Weight history includes She started gaining weight with her pregnancies.    Dieting attempts include She has tried diets ie Keto without significant weight loss.  She has tried medications including vyvance without weight loss...    Past Medical History:   Diagnosis Date    Achalasia     Achalasia     Anemia     Cervical disc disorder     Esophageal stricture     lower, requiring dilatation    Family history of colonic polyps     mother    GERD (gastroesophageal reflux disease)     History of COVID-19     History of Helicobacter pylori infection     Medial meniscus tear     LEFT    Sinus arrhythmia     Urinary, incontinence, stress female     Wears glasses     Wears glasses        CLINICAL DATA:  52 y.o.-year-old Black or  female.  Height: 5'10"  Weight: 312 lbs  IBW: 156 lbs  BMI: 44.88  The patient's goal weight (50% EBW): 234 lbs      Goal for Bariatric " Surgery: to improve health and to improve quality of life    DAILY NUTRITIONAL NEEDS: pre-op nutritional bariatric guidelines to promote weight loss  5166-0090 Calories    Grams Protein    NUTRITION & HEALTH HISTORY:  Greatest challenge: dining out frequency, sweets, portion control, and irregular meal patterns    Current diet recall: 2 meals irregular 9 to 6  Skips breakfast  L: steak and broccoli or child combo meal  D: Salvadorean food- soup with starch potato  Snacks: twice a day- trail mix or chips or fruit banana  Current Diet:  Meal pattern: irregular - prone to eating unhealthy foods when stressed at work   Protein supplements: slimfast protein shakes- does not like them anymore.  Can tolerate isopure  Snackin / day  Vegetables: Likes a variety. Eats almost daily.  Fruits: Likes a few. Eats almost daily.  Beverages: water gave up coffee on occ grapefruit juice, carnation evaporated milk with cereal and tea. Stopped drinking Kefir milk  Dining out: Weekly. Mostly restaurants.less fast foods, eat out 1-2 times per month on weekends  Cooking at home: Daily. Mostly  Stews  meat, starchy CHO, and vegetables.    Exercise:  Past exercise: None    Current exercise: None  Restrictions to exercise: torn mensicus and had surgery but still has pain when walking    Vitamins / Minerals / Herbs:   None reported    Labs:   No recent    Food Allergies:   None reported    Social:  Works regular daytime shifts.  Lives with family -.  Grocery shopping and food prep patient.  Patient believes the household will be supportive after surgery.  Alcohol: None.  Smoking: None.    ASSESSMENT:  Patient reports attempts at weight loss, only to regain lost weight.  Patient demonstrated knowledge of healthy eating behaviors and exercise patterns; admits to not eating healthy and not exercising at this point.  Patient states willingness to change lifestyle and make behavior modifications .        Barriers to Education:  none    Stage of change: determination and action    NUTRITION DIAGNOSIS:     Morbid Obesity related to Excessive calorie intake and Physical inactivity as evidence by BMI.    BARIATRIC DIET DISCUSSION/PLAN:  Discussed diet after surgery and related to patient's food record.  Reviewed nutrition guidelines for before and after surgery.  Answered all questions.  Work on Bariatric Nutrition Checklist.  Work on expanding variety of vegetables.  Work on gradually cutting back on starchy CHO in the diet.  1200-calorie diet.  1500-calorie diet.  5-6 meals per day.  Start including protein supplements in the diet plan daily.  Reduce frequency of dining out.  More grocery shopping and meal preparation at home.  Return to clinic.    RECOMMENDATIONS:  Patient is a potential candidate for bariatric surgery.    Needs additional visit(s) with RD.    Patient verbalized understanding.    Expect good  compliance after surgery at this time.    Communicated nutrition plan with bariatric team.    SESSION TIME:  45 minutes

## 2023-04-05 ENCOUNTER — PATIENT MESSAGE (OUTPATIENT)
Dept: BARIATRICS | Facility: CLINIC | Age: 53
End: 2023-04-05
Payer: COMMERCIAL

## 2023-04-11 ENCOUNTER — PATIENT MESSAGE (OUTPATIENT)
Dept: BARIATRICS | Facility: CLINIC | Age: 53
End: 2023-04-11
Payer: COMMERCIAL

## 2023-05-03 ENCOUNTER — PATIENT MESSAGE (OUTPATIENT)
Dept: BARIATRICS | Facility: CLINIC | Age: 53
End: 2023-05-03
Payer: COMMERCIAL

## 2023-05-09 ENCOUNTER — PATIENT MESSAGE (OUTPATIENT)
Dept: BARIATRICS | Facility: CLINIC | Age: 53
End: 2023-05-09
Payer: COMMERCIAL

## 2023-06-07 ENCOUNTER — PATIENT MESSAGE (OUTPATIENT)
Dept: BARIATRICS | Facility: CLINIC | Age: 53
End: 2023-06-07
Payer: COMMERCIAL

## 2023-06-13 ENCOUNTER — PATIENT MESSAGE (OUTPATIENT)
Dept: BARIATRICS | Facility: CLINIC | Age: 53
End: 2023-06-13
Payer: COMMERCIAL

## 2023-08-02 ENCOUNTER — PATIENT MESSAGE (OUTPATIENT)
Dept: BARIATRICS | Facility: CLINIC | Age: 53
End: 2023-08-02
Payer: COMMERCIAL

## 2023-09-06 ENCOUNTER — PATIENT MESSAGE (OUTPATIENT)
Dept: BARIATRICS | Facility: CLINIC | Age: 53
End: 2023-09-06
Payer: COMMERCIAL

## 2023-09-12 ENCOUNTER — PATIENT MESSAGE (OUTPATIENT)
Dept: BARIATRICS | Facility: CLINIC | Age: 53
End: 2023-09-12
Payer: COMMERCIAL

## 2023-10-04 ENCOUNTER — PATIENT MESSAGE (OUTPATIENT)
Dept: BARIATRICS | Facility: CLINIC | Age: 53
End: 2023-10-04
Payer: COMMERCIAL

## 2023-10-10 ENCOUNTER — PATIENT MESSAGE (OUTPATIENT)
Dept: BARIATRICS | Facility: CLINIC | Age: 53
End: 2023-10-10
Payer: COMMERCIAL

## 2023-11-14 ENCOUNTER — PATIENT MESSAGE (OUTPATIENT)
Dept: BARIATRICS | Facility: CLINIC | Age: 53
End: 2023-11-14
Payer: COMMERCIAL

## 2023-12-12 ENCOUNTER — PATIENT MESSAGE (OUTPATIENT)
Dept: BARIATRICS | Facility: CLINIC | Age: 53
End: 2023-12-12
Payer: COMMERCIAL

## 2023-12-13 ENCOUNTER — PATIENT MESSAGE (OUTPATIENT)
Dept: BARIATRICS | Facility: CLINIC | Age: 53
End: 2023-12-13
Payer: COMMERCIAL

## 2023-12-18 ENCOUNTER — PATIENT MESSAGE (OUTPATIENT)
Dept: BARIATRICS | Facility: CLINIC | Age: 53
End: 2023-12-18
Payer: COMMERCIAL

## 2024-01-09 ENCOUNTER — PATIENT MESSAGE (OUTPATIENT)
Dept: BARIATRICS | Facility: CLINIC | Age: 54
End: 2024-01-09
Payer: COMMERCIAL

## 2024-02-14 ENCOUNTER — PATIENT MESSAGE (OUTPATIENT)
Dept: BARIATRICS | Facility: CLINIC | Age: 54
End: 2024-02-14
Payer: COMMERCIAL

## 2024-02-20 ENCOUNTER — PATIENT MESSAGE (OUTPATIENT)
Dept: BARIATRICS | Facility: CLINIC | Age: 54
End: 2024-02-20
Payer: COMMERCIAL

## 2024-02-28 ENCOUNTER — TELEPHONE (OUTPATIENT)
Dept: BARIATRICS | Facility: CLINIC | Age: 54
End: 2024-02-28
Payer: COMMERCIAL

## 2024-02-29 ENCOUNTER — PATIENT MESSAGE (OUTPATIENT)
Dept: BARIATRICS | Facility: CLINIC | Age: 54
End: 2024-02-29
Payer: COMMERCIAL

## 2024-03-06 ENCOUNTER — PATIENT MESSAGE (OUTPATIENT)
Dept: BARIATRICS | Facility: CLINIC | Age: 54
End: 2024-03-06
Payer: COMMERCIAL

## 2024-03-06 ENCOUNTER — TELEPHONE (OUTPATIENT)
Dept: BARIATRICS | Facility: CLINIC | Age: 54
End: 2024-03-06
Payer: COMMERCIAL

## 2024-04-03 ENCOUNTER — PATIENT MESSAGE (OUTPATIENT)
Dept: BARIATRICS | Facility: CLINIC | Age: 54
End: 2024-04-03
Payer: COMMERCIAL

## 2024-04-09 ENCOUNTER — PATIENT MESSAGE (OUTPATIENT)
Dept: BARIATRICS | Facility: CLINIC | Age: 54
End: 2024-04-09
Payer: COMMERCIAL

## 2024-04-11 ENCOUNTER — PATIENT MESSAGE (OUTPATIENT)
Dept: BARIATRICS | Facility: CLINIC | Age: 54
End: 2024-04-11
Payer: COMMERCIAL

## 2024-04-11 ENCOUNTER — TELEPHONE (OUTPATIENT)
Dept: BARIATRICS | Facility: CLINIC | Age: 54
End: 2024-04-11
Payer: COMMERCIAL

## 2024-04-11 NOTE — TELEPHONE ENCOUNTER
"3rd follow up letter sent. Bariatric dashboard updated.  Patient ended work up for medical reasons.  Per patient "due to my history of Heller Myotomy to treat the achalasia,  I'm concerned about any complications that would interfere with the success of the surgery.    I have opted to seek medical weight loss treatment with my primary care physician. "  See MyChart message.   "

## 2024-04-25 NOTE — PROGRESS NOTES
"NUTRITIONAL CONSULT    Referring Physician: Vineet Appiah M.D.  Reason for MNT Referral: Initial assessment for unsure at this time work-up    PAST MEDICAL HISTORY:   48 y.o. female  Body mass index is 46.86 kg/m².  Dieting attempts include low carb diet and going to gym - lost weight; keto; walking every morning - best results down to sz 12. Pt reports can not walk or lift weights due to shoulder pain.    Past Medical History:   Diagnosis Date    Anemia     Cervical disc disorder     Medial meniscus tear     LEFT    Sinus arrhythmia     Urinary, incontinence, stress female     Wears glasses     Wears glasses        CLINICAL DATA:  48 y.o.-year-old Black or  female.  Height: 5'7"  Weight: 299 lbs  IBW: 147 lbs  BMI: 46.86  The patient's goal weight (50% EBW): 223 lbs  Personal goal weight: 10-12 size    Goal for Bariatric Surgery: to lose weight and help with back and knee pain    NUTRITIONAL NEEDS:  2009 x 1.2 activity factor = 2410 - 1000 for wt loss = 1410 Calories (using [place] [address] Jeor Equation)  100-114 Grams Protein (1.5-1.7 gm/kg IBW)     NUTRITION & HEALTH HISTORY:  Greatest challenge: sweets and time to meal prep and go to store along with having "right foods" to have at home    Current diet recall: Modified Keto diet  Breakfast: smoothie (kale, spinach, orange, lemon, apple, millie - may have chelsae/flax or avocado)   Or omelete or none  Lunch: salad with eggs or left overs   Dinner: rice/potatoes, chicken/fish, vegetables or  Salad, salmon/egg/steak, [place] dressing  Snack: nuts or dates    Current Diet:  Snackin-2 / day  Vegetables: Likes a variety. Eats daily.  Fruits: Likes a variety. Eats daily.  Beverages: water and sweet tea with honey  Dining out: Monthly. Mostly restaurants. Maybe 1/wk  Cooking at home: Daily. Mostly baked and grilled meat, fish, starchy CHO and vegetables.    Exercise:  Current exercise: None  Restrictions to exercise: knee and shoulder " pain    Vitamins / Minerals / Herbs:   none      Labs:   No recent    Food Allergies:   none    Social:  mostly days and occasional evenings  Lives with  and 3 kids (11y-21y)  Grocery shopping and food prep  -  Every body helps.  Patient believes the household will be supportive after surgery.  Alcohol: None.  Smoking: None.    ASSESSMENT:  · Patient reports attempts at weight loss, only to regain lost weight.  · Patient demonstrated knowledge of healthy eating behaviors and exercise patterns; admits to not eating healthy and not exercising at this point.  · Patient states willingness to change lifestyle and make behavior modifications as evidenced by pt's plan to make healthier lifestyle changes after today's visit.    Pt is self pay.    Barriers to Education: none    Stage of change: determination    NUTRITION DIAGNOSIS:    Obesity related to Excessive carbohydrate intake and Physical inactivity as evidence by BMI.    BARIATRIC DIET DISCUSSION/PLAN:  Discussed diet after surgery and related to patient's food record.  Reviewed nutrition guidelines for before and after surgery.  Answered all questions.  Continue to review Bariatric Nutrition Guidebook at home and call with any questions.  Work on expanding variety of vegetables.  Work on gradually cutting back on starchy CHO in the diet.  Begin trying various protein supplements to determine preference.  5-6 meals per day.  More grocery shopping and meal preparation at home.  Return to clinic.  keep food log    RECOMMENDATIONS:  Patient is a potential candidate for bariatric surgery.    Needs additional visit(s) with RD.    Patient verbalized understanding.    Expect good  compliance after surgery at this time.    Communicated nutrition plan with bariatric team.    SESSION TIME:  60 minutes   No